# Patient Record
Sex: MALE | Employment: PART TIME | URBAN - METROPOLITAN AREA
[De-identification: names, ages, dates, MRNs, and addresses within clinical notes are randomized per-mention and may not be internally consistent; named-entity substitution may affect disease eponyms.]

---

## 2018-04-11 ENCOUNTER — HOSPITAL ENCOUNTER (OUTPATIENT)
Dept: GENERAL RADIOLOGY | Age: 28
Discharge: HOME | End: 2018-04-11
Attending: FAMILY MEDICINE
Payer: COMMERCIAL

## 2018-04-11 DIAGNOSIS — S83.91XA SPRAIN OF KNEE/LEG, RIGHT, INITIAL ENCOUNTER: ICD-10-CM

## 2018-04-11 PROCEDURE — 73560 X-RAY EXAM OF KNEE 1 OR 2: CPT | Performed by: FAMILY MEDICINE

## 2020-05-19 PROCEDURE — 87070 CULTURE OTHR SPECIMN AEROBIC: CPT | Performed by: DERMATOLOGY

## 2020-05-19 PROCEDURE — 87077 CULTURE AEROBIC IDENTIFY: CPT | Performed by: DERMATOLOGY

## 2020-05-19 PROCEDURE — 87205 SMEAR GRAM STAIN: CPT | Performed by: DERMATOLOGY

## 2020-05-20 ENCOUNTER — LAB REQUISITION (OUTPATIENT)
Dept: LAB | Facility: HOSPITAL | Age: 30
End: 2020-05-20
Payer: COMMERCIAL

## 2020-05-20 DIAGNOSIS — L30.9 DERMATITIS, UNSPECIFIED: ICD-10-CM

## 2022-06-27 ENCOUNTER — HOSPITAL ENCOUNTER (EMERGENCY)
Age: 32
Discharge: HOME | End: 2022-06-27
Attending: EMERGENCY MEDICINE
Payer: COMMERCIAL

## 2022-06-27 VITALS
HEIGHT: 66 IN | WEIGHT: 200 LBS | DIASTOLIC BLOOD PRESSURE: 66 MMHG | RESPIRATION RATE: 16 BRPM | SYSTOLIC BLOOD PRESSURE: 114 MMHG | OXYGEN SATURATION: 99 % | BODY MASS INDEX: 32.14 KG/M2 | HEART RATE: 64 BPM | TEMPERATURE: 98 F

## 2022-06-27 DIAGNOSIS — U07.1 COVID-19: Primary | ICD-10-CM

## 2022-06-27 PROCEDURE — 99284 EMERGENCY DEPT VISIT MOD MDM: CPT

## 2022-06-27 RX ORDER — BEBTELOVIMAB 87.5 MG/ML
175 INJECTION, SOLUTION INTRAVENOUS ONCE
Status: COMPLETED | OUTPATIENT
Start: 2022-06-27 | End: 2022-06-27

## 2022-06-27 RX ORDER — CEPHALEXIN 500 MG/1
500 TABLET ORAL AS DIRECTED
COMMUNITY

## 2022-06-27 RX ORDER — MELOXICAM 15 MG/1
15 TABLET ORAL DAILY
COMMUNITY

## 2022-06-28 NOTE — ED INITIAL ASSESSMENT (HPI)
Pt just returned from [place], family tested positive today.  Pt with runny nose, cough, sneezing, + fatigue since Friday    denies fevers, nausea or vomiting

## 2023-03-03 ENCOUNTER — OFFICE VISIT (OUTPATIENT)
Dept: PODIATRY CLINIC | Facility: CLINIC | Age: 33
End: 2023-03-03

## 2023-03-03 DIAGNOSIS — L84 CALLUS OF FOOT: Primary | ICD-10-CM

## 2023-03-03 DIAGNOSIS — M79.672 PAIN IN BOTH FEET: ICD-10-CM

## 2023-03-03 DIAGNOSIS — M79.671 PAIN IN BOTH FEET: ICD-10-CM

## 2023-03-03 PROCEDURE — 99203 OFFICE O/P NEW LOW 30 MIN: CPT | Performed by: STUDENT IN AN ORGANIZED HEALTH CARE EDUCATION/TRAINING PROGRAM

## 2023-03-03 PROCEDURE — 11056 PARNG/CUTG B9 HYPRKR LES 2-4: CPT | Performed by: STUDENT IN AN ORGANIZED HEALTH CARE EDUCATION/TRAINING PROGRAM

## 2024-10-20 ENCOUNTER — HOSPITAL ENCOUNTER (EMERGENCY)
Age: 34
Discharge: HOME | End: 2024-10-20
Attending: EMERGENCY MEDICINE
Payer: MEDICARE

## 2024-10-20 VITALS
OXYGEN SATURATION: 100 % | SYSTOLIC BLOOD PRESSURE: 127 MMHG | RESPIRATION RATE: 16 BRPM | HEART RATE: 86 BPM | TEMPERATURE: 97 F | DIASTOLIC BLOOD PRESSURE: 87 MMHG

## 2024-10-20 DIAGNOSIS — L03.313 CELLULITIS OF CHEST WALL: ICD-10-CM

## 2024-10-20 DIAGNOSIS — L02.91 ABSCESS: Primary | ICD-10-CM

## 2024-10-20 PROCEDURE — 10061 I&D ABSCESS COMP/MULTIPLE: CPT

## 2024-10-20 PROCEDURE — 96365 THER/PROPH/DIAG IV INF INIT: CPT

## 2024-10-20 PROCEDURE — 99284 EMERGENCY DEPT VISIT MOD MDM: CPT

## 2024-10-20 RX ORDER — FEXOFENADINE HCL 180 MG/1
TABLET ORAL AS DIRECTED
COMMUNITY

## 2024-10-20 RX ORDER — FLUOCINONIDE 0.5 MG/G
1 CREAM TOPICAL AS DIRECTED
COMMUNITY

## 2024-10-20 RX ORDER — MONTELUKAST SODIUM 10 MG/1
10 TABLET ORAL DAILY
COMMUNITY
Start: 2024-08-20

## 2024-10-20 RX ORDER — DOXYCYCLINE HYCLATE 50 MG/1
100 TABLET, DELAYED RELEASE ORAL DAILY
COMMUNITY

## 2024-10-20 NOTE — ED PROVIDER NOTES
Patient Seen in: [facility] In [place]      History     Chief Complaint   Patient presents with    Cellulitis     Stated Complaint: cellulitis    Subjective:   HPI    History obtained primarily from the patient's mother.  Patient has a history of Down's syndrome.  Patient was brought in by mother after he noted pain in his left pectoral region.  The mother noted that there was a draining abscess with surrounding erythema.  The patient's mother states that patient has had frequent abscesses in the past and has been treated for hidradenitis on an ongoing basis with prophylactic doxycycline.  The patient has had no fever or chills.  No other cutaneous or other acute symptoms of been noted by the patient or the patient's mother.    Objective:     Past Medical History:    Alopecia    Arthritis    Down syndrome (HCC)    Hidradenitis suppurativa              Past Surgical History:   Procedure Laterality Date    Hc implant ear tubes      Tonsillectomy                  Social History     Socioeconomic History    Marital status: Single   Tobacco Use    Smoking status: Never    Smokeless tobacco: Never   Vaping Use    Vaping status: Never Used   Substance and Sexual Activity    Alcohol use: Never    Drug use: Never                  Physical Exam     ED Triage Vitals [10/20/24 1020]   /81   Pulse 89   Resp 14   Temp 98.3 °F (36.8 °C)   Temp src Temporal   SpO2 100 %   O2 Device None (Room air)       Current Vitals:   Vital Signs  BP: 140/81  Pulse: 89  Resp: 14  Temp: 98.3 °F (36.8 °C)  Temp src: Temporal    Oxygen Therapy  SpO2: 100 %  O2 Device: None (Room air)        Physical Exam  Vitals and nursing note reviewed.   Constitutional:       General: He is not in acute distress.     Appearance: He is well-developed. He is not ill-appearing or diaphoretic.      Comments: Down syndrome patient.  Patient does not appear ill or toxic.  Vitals are unremarkable.  No subjective or objective evidence for  Sirs or sepsis.   Skin:     Comments: Localized area of fluctuance and small amount of purulent drainage noted on the left pectoral region with surrounding erythema.  No crepitus is noted.   Neurological:      Mental Status: He is alert and oriented to person, place, and time.            ED Course   Labs Reviewed - No data to display    ED Course as of 10/20/24 1058  ------------------------------------------------------------  Time: 10/20 1040  Comment: The abscess was anesthetized with lidocaine. A cruciate incision was performed with an 11 blade.  Pus was expressed and loculations were freed.  The abscess cavity was irrigated copiously.    ------------------------------------------------------------  Time: 10/20 1043  Comment: I discussed potential hospitalization with the patient's mother who was in agreement to try outpatient treatment.  Initial dose of IV Ancef was administered and patient was discharged home with prescription for Keflex.     Differential of necrotizing soft tissue infection was considered, but the appearance of the area of infection, the patient's general appearance and vital signs were not consistent with such an infection.       MDM      Patient comes to the Emergency Department with an obvious abscess and surrounding cellulitis.  Abscess was incised and drained.  He was given initial dose of IV Ancef and discharged home with prescription for Keflex.  Mother instructed to have patient follow-up closely with primary care physician and to return immediately for any acute change or worsening symptoms.        Medical Decision Making      Disposition and Plan     Clinical Impression:  1. Abscess    2. Cellulitis of chest wall         Disposition:  Discharge  10/20/2024 10:43 am    Follow-up:  Vern Srivastava MD  [address] [place] [id number]  [phone]    Call in 1 day(s)            Medications Prescribed:  Current Discharge Medication List              Supplementary  Documentation:

## 2025-07-23 ENCOUNTER — HOSPITAL ENCOUNTER (EMERGENCY)
Age: 35
Discharge: HOME | End: 2025-07-23

## 2025-07-23 VITALS
OXYGEN SATURATION: 98 % | WEIGHT: 220 LBS | HEART RATE: 85 BPM | TEMPERATURE: 98 F | DIASTOLIC BLOOD PRESSURE: 78 MMHG | RESPIRATION RATE: 16 BRPM | SYSTOLIC BLOOD PRESSURE: 117 MMHG | BODY MASS INDEX: 36 KG/M2

## 2025-07-23 DIAGNOSIS — L02.214 ABSCESS OF LEFT GROIN: ICD-10-CM

## 2025-07-23 DIAGNOSIS — L73.2 HIDRADENITIS SUPPURATIVA: Primary | ICD-10-CM

## 2025-07-23 PROCEDURE — 10060 I&D ABSCESS SIMPLE/SINGLE: CPT

## 2025-07-23 PROCEDURE — 99283 EMERGENCY DEPT VISIT LOW MDM: CPT

## 2025-07-23 PROCEDURE — 99284 EMERGENCY DEPT VISIT MOD MDM: CPT

## 2025-07-23 RX ORDER — CLINDAMYCIN HYDROCHLORIDE 300 MG/1
300 CAPSULE ORAL 3 TIMES DAILY
Qty: 30 CAPSULE | Refills: 0 | Status: SHIPPED | OUTPATIENT
Start: 2025-07-23 | End: 2025-08-02

## 2025-07-24 NOTE — DISCHARGE INSTRUCTIONS
Rest and drink plenty of fluids.   Apply warm compresses for 20 minutes at a time, 4-6 times per day for the next 3-4 days.   Continue home medications as prescribed.    Start the antibiotics and make sure to finish the entire prescription.   Eat a yogurt everyday or take a probiotic to help with the GI effects of the antibiotic.   Take Tylenol and/or ibuprofen as needed for pain.   Follow up with your PCP or Dermatology in 3-4 days.     Thank you for choosing [facility] for your care.

## 2025-07-24 NOTE — ED PROVIDER NOTES
Patient Seen in: [facility] In [place]        History  Chief Complaint   Patient presents with    Abscess     Stated Complaint: cyst to groin    Subjective:   35-year-old male with a history of Down syndrome and hidradenitis suppurative presents to the emergency department with complaint of cyst.  Mom states patient gets these frequently and over the last few days has been complaining of more pain to the left groin where he has an abscess.  Mom states he has been taking his daily doxycycline as well as she was been using clindamycin gel to the area.  She states he does not usually complain of pain, but has been telling her that it has been painful.  She has been using warm compresses over the last few days, but it has not started to drain on its own.  She denies any fever, chills, or bodyaches.    The history is provided by a parent and the patient.                   Objective:     Past Medical History:    Alopecia    Arthritis    Down syndrome (HCC)    Hidradenitis suppurativa              Past Surgical History:   Procedure Laterality Date    Hc implant ear tubes      Tonsillectomy                  Social History     Socioeconomic History    Marital status: Single   Tobacco Use    Smoking status: Never    Smokeless tobacco: Never   Vaping Use    Vaping status: Never Used   Substance and Sexual Activity    Alcohol use: Never    Drug use: Never                                Physical Exam    ED Triage Vitals [07/23/25 1941]   /78   Pulse 90   Resp 16   Temp 98.3 °F (36.8 °C)   Temp src Temporal   SpO2 98 %   O2 Device None (Room air)       Current Vitals:   Vital Signs  BP: 117/78  Pulse: 85  Resp: 16  Temp: 98.3 °F (36.8 °C)  Temp src: Temporal    Oxygen Therapy  SpO2: 98 %  O2 Device: None (Room air)            Physical Exam  Vitals and nursing note reviewed. Exam conducted with a chaperone present.   Constitutional:       General: He is not in acute distress.     Appearance: Normal  appearance. He is normal weight. He is not ill-appearing.   HENT:      Head: Normocephalic and atraumatic.      Nose: Nose normal.      Mouth/Throat:      Mouth: Mucous membranes are moist.      Pharynx: Oropharynx is clear.   Eyes:      Conjunctiva/sclera: Conjunctivae normal.      Pupils: Pupils are equal, round, and reactive to light.   Cardiovascular:      Rate and Rhythm: Normal rate and regular rhythm.      Pulses: Normal pulses.      Heart sounds: Normal heart sounds.   Pulmonary:      Effort: Pulmonary effort is normal. No respiratory distress.      Breath sounds: Normal breath sounds.   Musculoskeletal:         General: Normal range of motion.   Skin:     General: Skin is warm and dry.      Capillary Refill: Capillary refill takes less than 2 seconds.      Comments: Exam chaperoned by DIANA Calderon.    2 communicating abscesses to the left groin.  There are 2 areas of eminent rupture.  Area is tender to palpation.  No surrounding cellulitis or drainage from the area.   Neurological:      General: No focal deficit present.      Mental Status: He is alert and oriented to person, place, and time.   Psychiatric:         Mood and Affect: Mood normal.         Behavior: Behavior normal.               ED Course  Labs Reviewed - No data to display                     MDM       Medical Decision Making  36 yo male with HS and abscess to the groin.  Verbal consent received from patient and parent for I&D.      The abscess was not anesthetized.  An 18 gauge needle was inserted into the 2 areas of eminent rupture.  Pus was expressed and loculations were freed.  Patient tolerated well and a large amount of pus was removed.     Will plan for PO antibiotics for home.  Patient has a prior history of MRSA.  Will treat with clindamycin as patient is already on a daily doxycycline.  Reviewed with parent continued supportive management at home.  Follow up with PCP or dermatology in the next week.  No evidence of sepsis.  Do not  feel that any labs or imaging is necessary at this time.               Amount and/or Complexity of Data Reviewed  Independent Historian: parent    Risk  OTC drugs.  Prescription drug management.        Disposition and Plan     Clinical Impression:  1. Hidradenitis suppurativa    2. Abscess of left groin         Disposition:  Discharge  7/23/2025  9:26 pm    Follow-up:  Vern Srivastava MD  [address] [address]  [phone]    Follow up in 1 week(s)            Medications Prescribed:  Discharge Medication List as of 7/23/2025  9:30 PM        START taking these medications    Details   clindamycin 300 MG Oral Cap Take 1 capsule (300 mg total) by mouth 3 (three) times daily for 10 days., Normal, Disp-30 capsule, R-0                   Supplementary Documentation: